# Patient Record
Sex: MALE | Race: BLACK OR AFRICAN AMERICAN | NOT HISPANIC OR LATINO | URBAN - METROPOLITAN AREA
[De-identification: names, ages, dates, MRNs, and addresses within clinical notes are randomized per-mention and may not be internally consistent; named-entity substitution may affect disease eponyms.]

---

## 2018-07-24 ENCOUNTER — HOSPITAL ENCOUNTER (EMERGENCY)
Facility: HOSPITAL | Age: 28
Discharge: HOME | End: 2018-07-24
Attending: EMERGENCY MEDICINE
Payer: COMMERCIAL

## 2018-07-24 VITALS
DIASTOLIC BLOOD PRESSURE: 83 MMHG | HEIGHT: 74 IN | BODY MASS INDEX: 22.46 KG/M2 | OXYGEN SATURATION: 99 % | SYSTOLIC BLOOD PRESSURE: 128 MMHG | HEART RATE: 65 BPM | WEIGHT: 175 LBS | RESPIRATION RATE: 17 BRPM | TEMPERATURE: 99 F

## 2018-07-24 DIAGNOSIS — M54.50 LOW BACK PAIN: ICD-10-CM

## 2018-07-24 DIAGNOSIS — N50.812 PAIN IN LEFT TESTICLE: ICD-10-CM

## 2018-07-24 DIAGNOSIS — I86.1 VARICOCELE PRESENT ON ULTRASOUND OF SCROTUM: Primary | ICD-10-CM

## 2018-07-24 DIAGNOSIS — M79.651 RIGHT THIGH PAIN: ICD-10-CM

## 2018-07-24 DIAGNOSIS — M25.551 RIGHT HIP PAIN: ICD-10-CM

## 2018-07-24 LAB
BILIRUB UR QL STRIP: NEGATIVE
CLARITY UR REFRACT.AUTO: CLEAR
COLOR UR AUTO: YELLOW
GLUCOSE UR QL STRIP: NEGATIVE
HGB UR QL STRIP: NEGATIVE
KETONES UR QL STRIP: NEGATIVE
LEUKOCYTE ESTERASE UR QL STRIP: NEGATIVE
NITRITE UR QL STRIP: NEGATIVE
PH UR STRIP: 8 [PH] (ref 5–8)
PROT UR QL STRIP: NEGATIVE
SP GR UR STRIP: 1.01 (ref 1–1.03)
URN SPEC COLLECT METH UR: NORMAL
UROBILINOGEN UR STRIP-ACNC: 2 EU/DL

## 2018-07-24 PROCEDURE — 81003 URINALYSIS AUTO W/O SCOPE: CPT

## 2018-07-24 PROCEDURE — 25000003 PHARM REV CODE 250: Performed by: PHYSICIAN ASSISTANT

## 2018-07-24 PROCEDURE — 99284 EMERGENCY DEPT VISIT MOD MDM: CPT | Mod: 25

## 2018-07-24 PROCEDURE — 99284 EMERGENCY DEPT VISIT MOD MDM: CPT | Mod: ,,, | Performed by: PHYSICIAN ASSISTANT

## 2018-07-24 RX ORDER — NAPROXEN 500 MG/1
500 TABLET ORAL
Status: COMPLETED | OUTPATIENT
Start: 2018-07-24 | End: 2018-07-24

## 2018-07-24 RX ADMIN — NAPROXEN 500 MG: 500 TABLET ORAL at 08:07

## 2018-07-24 NOTE — ED TRIAGE NOTES
"Pt states on July 6 he was pinned between a truck and a metal trailer.   States he went to [facility] the next day and states he only had an xray.  States right thigh pain and intermittent testicle pain.  States "it feels like somebody is kicking me".  Denies blood in urine or dysuria.    "

## 2018-07-24 NOTE — ED PROVIDER NOTES
Encounter Date: 7/24/2018       History     Chief Complaint   Patient presents with    Swelling Scrotum     Pt presented to the ED via pov. Pt alert. Pt states 2 weeks ago he was pinned up between a trailer and a vehicle at work. Since then he has had swelling and no erections.     Patient is a 27 year old male with no significant PMHX. He presents to the ED for testicle pain. He reports having left testicle pain for approximately two weeks. Describes pain as intermittent and throbbing. Rates pain 4/10. Patient reports being able to maintain erection, despite triage documentation. Patient reports co-worker joking with triage nurse during check-in. Also, reports having low back pain and right leg pain. Reports being wedged between moving truck and trailer, while at work approximately two weeks ago. Denies penile pain, swelling, or discharge. Denies urinary/fecal incontinence, lower extremity weakness, or paresthesias. He denies fever,chills, nausea, vomiting, sob, chest pain, abd pain, dysuria, diarrhea, or constipation. He is a current everyday smoker and reports alcohol use.           Review of patient's allergies indicates:  No Known Allergies  History reviewed. No pertinent past medical history.  History reviewed. No pertinent surgical history.  History reviewed. No pertinent family history.  Social History   Substance Use Topics    Smoking status: Current Every Day Smoker     Packs/day: 0.50     Types: Cigarettes    Smokeless tobacco: Never Used    Alcohol use Yes      Comment: socially     Review of Systems   Constitutional: Negative for fever.   HENT: Negative for sore throat.    Respiratory: Negative for shortness of breath.    Cardiovascular: Negative for chest pain.   Gastrointestinal: Negative for abdominal pain, nausea and vomiting.   Genitourinary: Positive for testicular pain. Negative for discharge, dysuria, penile pain, penile swelling and scrotal swelling.   Musculoskeletal: Positive for  arthralgias (right leg pain) and back pain.   Skin: Negative for rash.   Neurological: Negative for weakness.   Hematological: Does not bruise/bleed easily.       Physical Exam     Initial Vitals [07/24/18 0750]   BP Pulse Resp Temp SpO2   128/83 65 17 98.5 °F (36.9 °C) 99 %      MAP       --         Physical Exam    Vitals reviewed.  Constitutional: He appears well-developed and well-nourished.   Patient resting comfortably in NAD on RA.    HENT:   Head: Normocephalic and atraumatic.   Nose: Nose normal.   Eyes: Conjunctivae and EOM are normal. Pupils are equal, round, and reactive to light.   Neck: Normal range of motion and full passive range of motion without pain. No spinous process tenderness and no muscular tenderness present. Normal range of motion present.   Cardiovascular: Normal rate and regular rhythm. Exam reveals no friction rub.    No murmur heard.  Pulmonary/Chest: Breath sounds normal. No respiratory distress. He has no wheezes. He has no rales.   Abdominal: Soft. Bowel sounds are normal. He exhibits no distension. There is no tenderness.   Genitourinary: Right testis shows no mass, no swelling and no tenderness. Left testis shows no mass, no swelling and no tenderness. Uncircumcised. No penile erythema or penile tenderness. No discharge found.   Musculoskeletal: Normal range of motion.        Right hip: He exhibits tenderness. He exhibits normal range of motion, normal strength and no deformity.        Lumbar back: He exhibits tenderness, bony tenderness and pain. He exhibits normal range of motion and no deformity.   Midline spinal tenderness of lumbar region.    Lymphadenopathy: No inguinal adenopathy noted on the right or left side.   Neurological: He is alert and oriented to person, place, and time. He has normal strength. No sensory deficit. Gait normal.   Reflex Scores:       Patellar reflexes are 2+ on the right side and 2+ on the left side.  Skin: Skin is warm and dry. No erythema.    Psychiatric: He has a normal mood and affect. Thought content normal.         ED Course   Procedures  Labs Reviewed   URINALYSIS, REFLEX TO URINE CULTURE    Narrative:     Preferred Collection Type->Urine, Clean Catch          Imaging Results          US Scrotum And Testicles (Final result)  Result time 07/24/18 09:48:33    Final result by Mao Neff MD (07/24/18 09:48:33)                 Impression:      Small left-sided varicocele measuring 3 mm.    No other significant sonographic abnormality.      Electronically signed by: Mao Neff MD  Date:    07/24/2018  Time:    09:48             Narrative:    EXAMINATION:  US SCROTUM AND TESTICLES    CLINICAL HISTORY:  Left testicular pain    TECHNIQUE:  Sonography of the scrotum and testes.    COMPARISON:  None.    FINDINGS:  Right Testicle:    Size: 4.2 x 2.3 x 3.0 cm    Appearance: Normal    Flow: Normal arterial and venous flow    Epididymis: Normal    Hydrocele: None    Varicocele: None    Left Testicle:    Size: 4.2 x 2.7 x 2.1 cm    Appearance: Normal    Flow: Normal arterial and venous flow    Epididymis: Normal    Hydrocele: None    Varicocele: Small left-sided varicocele measuring 3 mm.                               X-Ray Lumbar Spine Ap And Lateral (Final result)  Result time 07/24/18 08:57:37    Final result by David Jaffe MD (07/24/18 08:57:37)                 Impression:      No significant abnormality.  No evidence of compression fracture.      Electronically signed by: David Jaffe MD  Date:    07/24/2018  Time:    08:57             Narrative:    EXAMINATION:  XR LUMBAR SPINE AP AND LATERAL    CLINICAL HISTORY:  Low back pain, minor trauma;    TECHNIQUE:  Three views of the lumbar spine were obtained, with AP, lateral, and lateral lumbosacral projections submitted.    COMPARISON:  No relevant comparison examinations are currently available.    FINDINGS:  No significant alignment abnormality.  Vertebral body heights are normally maintained,  without compression deformity at any level.  No significant disc narrowing.  Vertebral endplates are well defined.  No osseous destructive process.                               X-Ray Femur 2 AP/LAT Right (Final result)  Result time 07/24/18 08:58:19    Final result by Radames Talbot Jr., MD (07/24/18 08:58:19)                 Impression:      No significant abnormality.      Electronically signed by: Radames Talbot MD  Date:    07/24/2018  Time:    08:58             Narrative:    EXAMINATION:  XR FEMUR 2 VIEW RIGHT    CLINICAL HISTORY:  Pain in right thigh    TECHNIQUE:  AP and lateral views of the right femur were performed.    COMPARISON:  None    FINDINGS:  Bones are well mineralized.  No fracture.                               X-Ray Pelvis Routine AP (Final result)  Result time 07/24/18 08:56:59    Final result by Radames Talbot Jr., MD (07/24/18 08:56:59)                 Impression:      No significant abnormality seen.      Electronically signed by: Radames Talbot MD  Date:    07/24/2018  Time:    08:56             Narrative:    EXAMINATION:  XR PELVIS ROUTINE AP    CLINICAL HISTORY:  Pain in right hip    TECHNIQUE:  AP view of the pelvis was performed.    COMPARISON:  None.    FINDINGS:  Bones are well mineralized.  No fracture or bony destruction seen.  Hip and SI joints are fairly well maintained.                                       APC / Resident Notes:   Patient is a 27 year old male with no significant PMHX. He presents to the ED for testicle pain.    Will order UA, imaging, and pain medication. Will continue to monitor.     Differential diagnoses include, but are not limited to: contusion, fracture, dislocation, or UTI. I considered but do not suspect testicular torsion.     UA unremarkable for infectious process. US scrotum and testicles found to have Small left-sided varicocele measuring 3 mm. Xray lumbar spine found to have No significant abnormality.  No evidence of compression fracture. Xray  femur found to have No significant abnormality. Xray pelvis found to have No significant abnormality seen.    Patient reassessed, reports symptoms improved with ED management. Patient ambulates within RWR without difficulty. I have discussed emergency department findings, and plan with the patient. Will discharge home with F/U with urology and PCP. Patient verbalizes understanding of plan and agrees. Return precautions given.     I have discussed and reviewed with my supervising physician.            Clinical Impression:   The primary encounter diagnosis was Varicocele present on ultrasound of scrotum. Diagnoses of Low back pain, Right hip pain, Right thigh pain, and Pain in left testicle were also pertinent to this visit.      Disposition:   Disposition: Discharged  Condition: Stable                        Teri Murphy PA-C  07/24/18 [id number]

## 2021-07-26 ENCOUNTER — HOSPITAL ENCOUNTER (EMERGENCY)
Facility: HOSPITAL | Age: 31
Discharge: HOME | End: 2021-07-26
Attending: EMERGENCY MEDICINE
Payer: COMMERCIAL

## 2021-07-26 VITALS
RESPIRATION RATE: 18 BRPM | DIASTOLIC BLOOD PRESSURE: 81 MMHG | SYSTOLIC BLOOD PRESSURE: 132 MMHG | TEMPERATURE: 99 F | OXYGEN SATURATION: 97 % | HEART RATE: 80 BPM

## 2021-07-26 DIAGNOSIS — U07.1 COVID-19 VIRUS DETECTED: ICD-10-CM

## 2021-07-26 DIAGNOSIS — U07.1 COVID-19: Primary | ICD-10-CM

## 2021-07-26 LAB
CTP QC/QA: YES
SARS-COV-2 RDRP RESP QL NAA+PROBE: POSITIVE

## 2021-07-26 PROCEDURE — 99284 EMERGENCY DEPT VISIT MOD MDM: CPT | Mod: CR,,, | Performed by: PHYSICIAN ASSISTANT

## 2021-07-26 PROCEDURE — 99284 PR EMERGENCY DEPT VISIT,LEVEL IV: ICD-10-PCS | Mod: CR,,, | Performed by: PHYSICIAN ASSISTANT

## 2021-07-26 PROCEDURE — 25000003 PHARM REV CODE 250: Performed by: PHYSICIAN ASSISTANT

## 2021-07-26 PROCEDURE — 99283 EMERGENCY DEPT VISIT LOW MDM: CPT

## 2021-07-26 PROCEDURE — U0002 COVID-19 LAB TEST NON-CDC: HCPCS | Performed by: EMERGENCY MEDICINE

## 2021-07-26 RX ORDER — ACETAMINOPHEN 325 MG/1
650 TABLET ORAL
Status: COMPLETED | OUTPATIENT
Start: 2021-07-26 | End: 2021-07-26

## 2021-07-26 RX ADMIN — ACETAMINOPHEN 650 MG: 325 TABLET ORAL at 02:07
